# Patient Record
Sex: MALE | Race: WHITE | Employment: OTHER | URBAN - NONMETROPOLITAN AREA
[De-identification: names, ages, dates, MRNs, and addresses within clinical notes are randomized per-mention and may not be internally consistent; named-entity substitution may affect disease eponyms.]

---

## 2017-06-15 ENCOUNTER — OFFICE VISIT (OUTPATIENT)
Dept: PULMONOLOGY | Age: 82
End: 2017-06-15

## 2017-06-15 VITALS
HEIGHT: 64 IN | HEART RATE: 65 BPM | DIASTOLIC BLOOD PRESSURE: 62 MMHG | RESPIRATION RATE: 16 BRPM | WEIGHT: 138 LBS | BODY MASS INDEX: 23.56 KG/M2 | OXYGEN SATURATION: 96 % | SYSTOLIC BLOOD PRESSURE: 126 MMHG

## 2017-06-15 DIAGNOSIS — Z99.89 OSA ON CPAP: ICD-10-CM

## 2017-06-15 DIAGNOSIS — G47.33 OSA ON CPAP: ICD-10-CM

## 2017-06-15 PROCEDURE — 1036F TOBACCO NON-USER: CPT | Performed by: INTERNAL MEDICINE

## 2017-06-15 PROCEDURE — G8427 DOCREV CUR MEDS BY ELIG CLIN: HCPCS | Performed by: INTERNAL MEDICINE

## 2017-06-15 PROCEDURE — 4040F PNEUMOC VAC/ADMIN/RCVD: CPT | Performed by: INTERNAL MEDICINE

## 2017-06-15 PROCEDURE — G8420 CALC BMI NORM PARAMETERS: HCPCS | Performed by: INTERNAL MEDICINE

## 2017-06-15 PROCEDURE — 1123F ACP DISCUSS/DSCN MKR DOCD: CPT | Performed by: INTERNAL MEDICINE

## 2017-06-15 PROCEDURE — 99213 OFFICE O/P EST LOW 20 MIN: CPT | Performed by: INTERNAL MEDICINE

## 2018-06-21 ENCOUNTER — OFFICE VISIT (OUTPATIENT)
Dept: PULMONOLOGY | Age: 83
End: 2018-06-21
Payer: MEDICARE

## 2018-06-21 VITALS
TEMPERATURE: 97.8 F | DIASTOLIC BLOOD PRESSURE: 62 MMHG | OXYGEN SATURATION: 97 % | WEIGHT: 146 LBS | HEART RATE: 62 BPM | HEIGHT: 64 IN | SYSTOLIC BLOOD PRESSURE: 120 MMHG | BODY MASS INDEX: 24.92 KG/M2

## 2018-06-21 DIAGNOSIS — G47.33 OSA ON CPAP: Primary | ICD-10-CM

## 2018-06-21 DIAGNOSIS — Z99.89 OSA ON CPAP: Primary | ICD-10-CM

## 2018-06-21 PROCEDURE — G8427 DOCREV CUR MEDS BY ELIG CLIN: HCPCS | Performed by: INTERNAL MEDICINE

## 2018-06-21 PROCEDURE — 1123F ACP DISCUSS/DSCN MKR DOCD: CPT | Performed by: INTERNAL MEDICINE

## 2018-06-21 PROCEDURE — 4040F PNEUMOC VAC/ADMIN/RCVD: CPT | Performed by: INTERNAL MEDICINE

## 2018-06-21 PROCEDURE — G8419 CALC BMI OUT NRM PARAM NOF/U: HCPCS | Performed by: INTERNAL MEDICINE

## 2018-06-21 PROCEDURE — 1036F TOBACCO NON-USER: CPT | Performed by: INTERNAL MEDICINE

## 2018-06-21 PROCEDURE — 99213 OFFICE O/P EST LOW 20 MIN: CPT | Performed by: INTERNAL MEDICINE

## 2018-06-21 RX ORDER — TRAZODONE HYDROCHLORIDE 50 MG/1
25 TABLET ORAL PRN
COMMUNITY

## 2018-11-29 NOTE — PROGRESS NOTES
stridor. No respiratory distress. No wheezes. No rales. Patient exhibits no tenderness. Abdominal: Soft. Patient exhibits no distension. No tenderness. Musculoskeletal: Normal range of motion. Extremities: Patient exhibits no edema and no tenderness. Lymphadenopathy:  No cervical adenopathy. Neurological: Patient is alert and oriented to person, place, and time. Skin: Skin is warm and dry. Patient is not diaphoretic. Psychiatric: Patient  has a normal mood and affect. Patient behavior is normal.      Diagnostic Data:  Echocardiogram: 1/4/16          Diagnostic Data:   PAP Download:   Recorded compliance dates:5/13/18-6/11/18  More than 4hour usage compliance was 93:%. Average residual Apnea- Hypoapnea index on current pressue was:6.2.       PAP Type cpap   Level  8      Average usuage hours per day was:.[id number]               Interface: full     Provider:  []TYSHAWN                 []Haydee                []Suzette  []Dash                               [][facility]          [x]Other: Medicine & More    Assessment:  -Mild obstructive sleep apnea on treatment with a CPAP pressure of 10cm H20. He is using his CPAP with good compliance for >4hours. How ever he is still left with a residual AHI of 5.4 on current pressure of 10cm H20. It improved from 6.2 on a pressure of 8cm H20.  -Allergic rhinitis- He is currently using Flonase.  -Atrial flutter, paroxysmal (HCC) S/p ablation by Dr. Trupti Nelson at [facility], [facility], [facility].  -Hypertension- under control.  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   -Hx of Prostate cancer S/p surgery and radiation therapy. Recommendations/Plan:  -He was advised to continue melatonin to 3mg PO Qhs PRN. -Continue  Flonase 50mcg/INH 2sprays each nostril daily.  He is buying over the counter.  -Will increase his CPAP pressure to a CPAP of 12 H20 due to his residual AHI of 5.4 on current CPAP pressure of 10cm H20.  -He was advised to

## 2018-12-06 ENCOUNTER — OFFICE VISIT (OUTPATIENT)
Dept: PULMONOLOGY | Age: 83
End: 2018-12-06
Payer: MEDICARE

## 2018-12-06 VITALS
HEART RATE: 58 BPM | WEIGHT: 149.4 LBS | SYSTOLIC BLOOD PRESSURE: 118 MMHG | DIASTOLIC BLOOD PRESSURE: 76 MMHG | OXYGEN SATURATION: 98 % | HEIGHT: 65 IN | BODY MASS INDEX: 24.89 KG/M2

## 2018-12-06 DIAGNOSIS — G47.33 OSA ON CPAP: Primary | ICD-10-CM

## 2018-12-06 DIAGNOSIS — Z99.89 OSA ON CPAP: Primary | ICD-10-CM

## 2018-12-06 PROCEDURE — G8428 CUR MEDS NOT DOCUMENT: HCPCS | Performed by: INTERNAL MEDICINE

## 2018-12-06 PROCEDURE — G8484 FLU IMMUNIZE NO ADMIN: HCPCS | Performed by: INTERNAL MEDICINE

## 2018-12-06 PROCEDURE — 4040F PNEUMOC VAC/ADMIN/RCVD: CPT | Performed by: INTERNAL MEDICINE

## 2018-12-06 PROCEDURE — 99213 OFFICE O/P EST LOW 20 MIN: CPT | Performed by: INTERNAL MEDICINE

## 2018-12-06 PROCEDURE — G8419 CALC BMI OUT NRM PARAM NOF/U: HCPCS | Performed by: INTERNAL MEDICINE

## 2018-12-06 PROCEDURE — 1036F TOBACCO NON-USER: CPT | Performed by: INTERNAL MEDICINE

## 2018-12-06 PROCEDURE — 1123F ACP DISCUSS/DSCN MKR DOCD: CPT | Performed by: INTERNAL MEDICINE

## 2018-12-06 PROCEDURE — 1101F PT FALLS ASSESS-DOCD LE1/YR: CPT | Performed by: INTERNAL MEDICINE

## 2019-05-26 NOTE — PROGRESS NOTES
[place] [facility] and [address]                                 Sleep medicine clinic follow note. Chief Complaint: Katherine Beach is a 6 month f/u with a download      Chief complaint and [place]: Lisseth Sharp is a 80 y. o.oldmale came for follow up regarding his sleep apnea. He is currently using his positive airway pressure device with a CPAP pressure of 12cm H20. He denies any problems with machine or mask. He is sleeping well at night with out difficulty. He denies any daytime sleepiness. He is currently using melatonin to 5mg PO Qhs PRN. He is sleeping well. He is currently retired. He used to work in a factory during daytime                                Review of Systems:   General/Constitutional: he remain at same weight from the last visit with normal appetite. No fever or chills. HENT: Negative. Eyes: Negative. Upper respiratory tract: No nasal stuffiness or post nasal drip. Lower respiratory tract/ lungs: No cough or sputum production. No hemoptysis. Cardiovascular: No palpitations or chest pain. Gastrointestinal: No nausea or vomiting. Neurological: No focal neurologiacal weakness. Extremities: No edema. Musculoskeletal: No complaints. Genitourinary: No complaints. Hematological: Negative. Psychiatric/Behavioral: Negative. Skin: No itching.         Past Medical History:   Diagnosis Date    Atrial flutter, paroxysmal ([address]     Chronic kidney disease     Hyperlipidemia     Hypertension     Insomnia     KM on CPAP        Past Surgical History:   Procedure Laterality Date    FINGER TRIGGER RELEASE      HERNIA REPAIR      HYDROCELE EXCISION      PROSTATECTOMY         No Known Allergies    Current Outpatient Medications   Medication Sig Dispense Refill    b complex vitamins capsule Take 1 capsule by mouth daily 1000 mcg every other day      CPAP Machine MISC by Does not apply route Please increase his CPAP pressure to a CPAP of 12 H20 due to his residual AHI of 5.4 on current CPAP pressure of 10cm H20. 1 each 0    traZODone (DESYREL) 50 MG tablet Take 25 mg by mouth as needed for Sleep      RANITIDINE HCL PO Take by mouth daily      warfarin (COUMADIN) 5 MG tablet Take 5 mg by mouth 2.5 mg 5 days and 5mg 2 days      metoprolol (LOPRESSOR) 25 MG tablet Take 25 mg by mouth 2 times daily Take 1.5 tabs twice daily      Ascorbic Acid (VITAMIN C) 500 MG CAPS Take by mouth      calcium carbonate 600 MG TABS tablet Take 1 tablet by mouth daily      Multiple Vitamins-Minerals (THERAPEUTIC MULTIVITAMIN-MINERALS) tablet Take 1 tablet by mouth daily      losartan (COZAAR) 100 MG tablet Take 50 mg by mouth daily       melatonin 5 MG TABS tablet Take 3 mg by mouth daily       amLODIPine (NORVASC) 5 MG tablet Take 5 mg by mouth daily Take 1.5 tabs daily      fluticasone (FLONASE) 50 MCG/ACT nasal spray 2 sprays by Nasal route daily (Patient taking differently: 2 sprays by Nasal route as needed ) 1 Bottle 6    Omega 3 1000 MG CAPS Take by mouth       No current facility-administered medications for this visit. Family History   Problem Relation Age of Onset    High Blood Pressure Sister     Stroke Sister           /78   Pulse 61   Ht 5' 5\" (1.651 m)   Wt 149 lb 12.8 oz (67.9 kg)   SpO2 97% Comment: room air at rest  BMI 24.93 kg/m²   Mallampati airway Class:4  Neck Circumference:.16 Inches  [place] sleepiness score 6/6/19: 8  SAQLI;14.       Physical Exam   Nursing note and vitals reviewed. Constitutional: Patient appears moderately built and moderately nourished. No distress. Patient is oriented to person, place, and time. HENT:   Head: Normocephalic and atraumatic. Right Ear: External ear normal.   Left Ear: External ear normal.   Mouth/Throat: Oropharynx is clear and moist.  No oral thrush. Eyes: Conjunctivae are normal. Pupils are equal, round, and reactive to light. No scleral icterus. Neck: Neck supple. No JVD present.  No tracheal deviation present. Cardiovascular: Normal rate, regular rhythm, normal heart sounds. No murmur heard. Pulmonary/Chest: Effort normal and breath sounds normal. No stridor. No respiratory distress. No wheezes. No rales. Patient exhibits no tenderness. Abdominal: Soft. Patient exhibits no distension. No tenderness. Musculoskeletal: Normal range of motion. Extremities: Patient exhibits no edema and no tenderness. Lymphadenopathy:  No cervical adenopathy. Neurological: Patient is alert and oriented to person, place, and time. Skin: Skin is warm and dry. Patient is not diaphoretic. Psychiatric: Patient  has a normal mood and affect. Patient behavior is normal.       Diagnostic Data:  Echocardiogram: 1/4/16          Diagnostic Data:   PAP Download:   Recorded compliance dates:4/22/19-5/21/19  More than 4hour usage compliance was:100%. Average residual Apnea- Hypoapnea index on current pressue was:4.0.       PAP Type CPAP   Level  12      Average usuage hours per day was:. [id number]         Interface: full     Provider:  []TYSHAWN                 []Haydee                []Suzette  []Dash                               [][facility]          [x]Other: MEDICINE & MORE    Assessment:  -Mild obstructive sleep apnea on treatment with a CPAP pressure of 12cm H20. He is using his CPAP with good compliance for >4hours. His residual AHI is under control with current pressure. Kike Platt currently using his CPAP/BiPAP/Auto SV machine and benefiting from it. Will continue his current therapy.  -Allergic rhinitis- He is currently using Flonase.  -Atrial flutter, paroxysmal (HCC) S/p ablation by Dr. Ines Lugo at [facility].  -Hypertension- under control.  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   -Hx of Prostate cancer S/p surgery and radiation therapy. Recommendations/Plan:  -He was advised to continue melatonin to 5mg PO Qhs PRN.   -Continue Flonase 50mcg/INH 2sprays each nostril daily. He is buying from over the counter.  -He was advised to continue positive airway pressure therapy with above described pressure.  -He advised to keep good compliance with current recommended pressure to get optimal results and clinical improvement.  -Follow with my clinic in 12months for clinical reevaluation with review of download. -He was advised to call [employer] regarding supplies if needed.  -He call my office for earlier appointment if needed for worsening of sleep symptoms.  -Miranda Tapia and his wife were educated about my impression and plan. They verbalizes understanding.

## 2019-06-06 ENCOUNTER — OFFICE VISIT (OUTPATIENT)
Dept: PULMONOLOGY | Age: 84
End: 2019-06-06
Payer: MEDICARE

## 2019-06-06 VITALS
WEIGHT: 149.8 LBS | BODY MASS INDEX: 24.96 KG/M2 | DIASTOLIC BLOOD PRESSURE: 78 MMHG | SYSTOLIC BLOOD PRESSURE: 122 MMHG | OXYGEN SATURATION: 97 % | HEIGHT: 65 IN | HEART RATE: 61 BPM

## 2019-06-06 DIAGNOSIS — G47.33 OSA ON CPAP: Primary | ICD-10-CM

## 2019-06-06 DIAGNOSIS — Z99.89 OSA ON CPAP: Primary | ICD-10-CM

## 2019-06-06 PROCEDURE — G8420 CALC BMI NORM PARAMETERS: HCPCS | Performed by: INTERNAL MEDICINE

## 2019-06-06 PROCEDURE — 99213 OFFICE O/P EST LOW 20 MIN: CPT | Performed by: INTERNAL MEDICINE

## 2019-06-06 PROCEDURE — 1123F ACP DISCUSS/DSCN MKR DOCD: CPT | Performed by: INTERNAL MEDICINE

## 2019-06-06 PROCEDURE — G8427 DOCREV CUR MEDS BY ELIG CLIN: HCPCS | Performed by: INTERNAL MEDICINE

## 2019-06-06 PROCEDURE — 1036F TOBACCO NON-USER: CPT | Performed by: INTERNAL MEDICINE

## 2019-06-06 PROCEDURE — 4040F PNEUMOC VAC/ADMIN/RCVD: CPT | Performed by: INTERNAL MEDICINE

## 2019-06-06 RX ORDER — VITAMIN B COMPLEX
1 CAPSULE ORAL DAILY
COMMUNITY
End: 2022-09-08

## 2019-06-06 NOTE — PROGRESS NOTES
Chief Complaint: Rand Zapata is a 6 month f/u with a download    Mallampati airway Class:4  Neck Circumference:.16 Inches    [place] sleepiness score 6/6/19: 8  SAQLI;14.      Diagnostic Data:   PAP Download:   Recorded compliance dates:4/22/19-5/21/19  More than 4hour usage compliance was:100%. Average residual Apnea- Hypoapnea index on current pressue was:4.0.      PAP Type CPAP   Level  12     Average usuage hours per day was:. [id number]     Interface: full    Provider:  []-MARYANA  []Haydee  []Suzette  []Dash         [][facility] [x]Other: MEDICINE & MORE

## 2020-05-21 NOTE — PROGRESS NOTES
[place] [facility] and [address]              Sleep medicine clinic follow note. Chief Complaint: Abbey Albright is a 1 year f/u with a download      Chief complaint and [place]: Trent Roas is a 80 y. o.oldmale came for follow up regarding his sleep apnea. He is currently using his positive airway pressure device with a CPAP pressure of 12cm H20. He denies any problems with machine or mask. He is sleeping well at night with out difficulty. He denies any daytime sleepiness. He is currently using melatonin to 5mg PO Qhs PRN. He is sleeping well. He is currently retired. He used to work in a factory during daytime         Review of systems:                           General/Constitutional: he lost 5lbs of weight from the last visit with normal appetite. No fever or chills. HENT: Negative. Eyes: Negative. Upper respiratory tract: No nasal stuffiness or post nasal drip. Lower respiratory tract/ lungs: No cough or sputum production. No hemoptysis. Cardiovascular: No palpitations or chest pain. Gastrointestinal: No nausea or vomiting. Neurological: No focal neurologiacal weakness. Extremities: No edema. Musculoskeletal: No complaints. Genitourinary: No complaints. Hematological: Negative. Psychiatric/Behavioral: Negative. Skin: No itching.       Past Medical History:   Diagnosis Date    Atrial flutter, paroxysmal ([address]     Chronic kidney disease     Hyperlipidemia     Hypertension     Insomnia     KM on CPAP        Past Surgical History:   Procedure Laterality Date    FINGER TRIGGER RELEASE      HERNIA REPAIR      HYDROCELE EXCISION      PROSTATECTOMY         No Known Allergies    Current Outpatient Medications   Medication Sig Dispense Refill    b complex vitamins capsule Take 1 capsule by mouth daily 1000 mcg every other day      CPAP Machine MISC by Does not apply route Please increase his CPAP pressure to a CPAP of 12 H20 due to his residual AHI of 5.4 on current CPAP pressure of 10cm H20. 1 each 0    traZODone (DESYREL) 50 MG tablet Take 25 mg by mouth as needed for Sleep      warfarin (COUMADIN) 5 MG tablet Take 2.5 mg by mouth       metoprolol (LOPRESSOR) 25 MG tablet Take 25 mg by mouth 2 times daily Take 1.5 tabs twice daily      Ascorbic Acid (VITAMIN C) 500 MG CAPS Take by mouth      calcium carbonate 600 MG TABS tablet Take 1 tablet by mouth daily      Multiple Vitamins-Minerals (THERAPEUTIC MULTIVITAMIN-MINERALS) tablet Take 1 tablet by mouth daily      losartan (COZAAR) 100 MG tablet Take 50 mg by mouth daily       amLODIPine (NORVASC) 5 MG tablet Take 5 mg by mouth daily Take 1.5 tabs daily      RANITIDINE HCL PO Take by mouth daily      fluticasone (FLONASE) 50 MCG/ACT nasal spray 2 sprays by Nasal route daily (Patient taking differently: 2 sprays by Nasal route as needed ) 1 Bottle 6    Omega 3 1000 MG CAPS Take by mouth      melatonin 5 MG TABS tablet Take 3 mg by mouth daily        No current facility-administered medications for this visit. Family History   Problem Relation Age of Onset    High Blood Pressure Sister     Stroke Sister           /64 (Site: Left Upper Arm, Position: Sitting, Cuff Size: Medium Adult)   Pulse 63   Ht 5' 5\" (1.651 m)   Wt 144 lb 9.6 oz (65.6 kg)   SpO2 96% Comment: room air at rest  BMI 24.06 kg/m²   Mallampati airway Class:4  Neck Circumference:.16 Inches  [place] sleepiness score 6/18/20: 8  SAQLI;14.     Physical Exam   Nursing note and vitals reviewed. Constitutional: Patient appears moderately built and moderately nourished. No distress. Patient is oriented to person, place, and time. HENT:   Head: Normocephalic and atraumatic. Right Ear: External ear normal.   Left Ear: External ear normal.   Mouth/Throat: Oropharynx is clear and moist.  No oral thrush. Eyes: Conjunctivae are normal. Pupils are equal, round, and reactive to light. No scleral icterus. Neck: Neck supple.  No JVD present. No tracheal deviation present. Cardiovascular: Normal rate, regular rhythm, normal heart sounds. No murmur heard. Pulmonary/Chest: Effort normal and breath sounds normal. No stridor. No respiratory distress. No wheezes. No rales. Patient exhibits no tenderness. Abdominal: Soft. Patient exhibits no distension. No tenderness. Musculoskeletal: Normal range of motion. Extremities: Patient exhibits no edema and no tenderness. Lymphadenopathy:  No cervical adenopathy. Neurological: Patient is alert and oriented to person, place, and time. Skin: Skin is warm and dry. Patient is not diaphoretic. Psychiatric: Patient  has a normal mood and affect. Patient behavior is normal.       Diagnostic Data:  Echocardiogram: 1/4/16          Diagnostic Data:     PAP Download:   Recorded compliance dates:5/3/20-6/1/20  More than 4hour usage compliance was:100%. Average residual Apnea- Hypoapnea index on current pressue was:3.1.       PAP Type cpap   Level  12      Average usuage hours per day was:.[id number]               Interface: full  Provider:  []?[facility]             []?[employer]                        []?[employer]          []? [employer]                           []?[facility]      [x]? Other: Medicine & More      Assessment:  -Mild obstructive sleep apnea on treatment with a CPAP pressure of 12cm H20. He is using his CPAP with good compliance for >4hours. His residual AHI is under control with current pressure. Tavon Aurosi currently using his CPAP machine and benefiting from it. Will continue his current therapy.  -Allergic rhinitis- He is currently using Flonase.  -Atrial flutter, paroxysmal (HCC) S[facility]  -Hypertension- under control.  -Insomnia due to sleep apnea- improved. He quit using Melatonin 5mg po Qhs.   -Hx of Prostate cancer S/p surgery and radiation therapy.       Recommendations/Plan:.  -Continue  Flonase 50mcg/INH 2sprays each nostril daily. He was advised to use Flonase with good compliance. He is buying from over the counter.  -He was advised to continue positive airway pressure therapy with above described pressure.  -He advised to keep good compliance with current recommended pressure to get optimal results and clinical improvement.  -Follow with my clinic in 12months for clinical reevaluation with review of download. -He was advised to call [employer] regarding supplies if needed.  -He call my office for earlier appointment if needed for worsening of sleep symptoms.  -Trent Rosa and his wife were educated about my impression and plan. They verbalizes understanding.

## 2020-06-18 ENCOUNTER — OFFICE VISIT (OUTPATIENT)
Dept: PULMONOLOGY | Age: 85
End: 2020-06-18
Payer: MEDICARE

## 2020-06-18 VITALS
OXYGEN SATURATION: 96 % | BODY MASS INDEX: 24.09 KG/M2 | WEIGHT: 144.6 LBS | HEART RATE: 63 BPM | HEIGHT: 65 IN | DIASTOLIC BLOOD PRESSURE: 64 MMHG | SYSTOLIC BLOOD PRESSURE: 122 MMHG

## 2020-06-18 PROCEDURE — 1036F TOBACCO NON-USER: CPT | Performed by: INTERNAL MEDICINE

## 2020-06-18 PROCEDURE — 99213 OFFICE O/P EST LOW 20 MIN: CPT | Performed by: INTERNAL MEDICINE

## 2020-06-18 PROCEDURE — 1123F ACP DISCUSS/DSCN MKR DOCD: CPT | Performed by: INTERNAL MEDICINE

## 2020-06-18 PROCEDURE — 4040F PNEUMOC VAC/ADMIN/RCVD: CPT | Performed by: INTERNAL MEDICINE

## 2020-06-18 PROCEDURE — G8420 CALC BMI NORM PARAMETERS: HCPCS | Performed by: INTERNAL MEDICINE

## 2020-06-18 PROCEDURE — G8427 DOCREV CUR MEDS BY ELIG CLIN: HCPCS | Performed by: INTERNAL MEDICINE

## 2020-06-18 RX ORDER — FLUTICASONE PROPIONATE 50 MCG
2 SPRAY, SUSPENSION (ML) NASAL DAILY
Qty: 1 BOTTLE | Refills: 11 | Status: SHIPPED | OUTPATIENT
Start: 2020-06-18 | End: 2021-06-18

## 2020-06-18 NOTE — PROGRESS NOTES
Chief Complaint: Kong Montano is a 1 year f/u with a download    Mallampati airway Class:4  Neck Circumference:.16 Inches    [place] sleepiness score 6/18/20: 8      Diagnostic Data:   PAP Download:   Recorded compliance dates:5/3/20-6/1/20  More than 4hour usage compliance was:100%. Average residual Apnea- Hypoapnea index on current pressue was:3.1.       PAP Type cpap   Level  12     Average usuage hours per day was:.[id number]     Interface: full  Provider:  []TYSHAWN  []Haydee  []Suzette  []Dash         [][facility] [x]Other: [facility] More

## 2021-08-12 NOTE — PROGRESS NOTES
[place] [facility] and [address]              Sleep medicine clinic follow note. Chief Complaint: Mallika Steward is a 1 year f/u with a download      Chief complaint and [place]: Brian Paez is a 80 y. o.oldmale came for follow up regarding his sleep apnea. He is currently using his positive airway pressure device with a CPAP pressure of 12cm H20. He denies any problems with machine or mask. He is sleeping well at night with out difficulty. He denies any daytime sleepiness. He recently underwent a treatment for sinus infection in the first week of Sep 2021. He not able to use his CPAP device on 1 Sep 2021. He is currently using melatonin to 5mg PO Qhs PRN. He is sleeping well. He is currently retired. He used to work in a factory during daytime         Review of systems:                           General/Constitutional: he lost 1lb of weight from the last visit. No fever or chills. HENT: Negative. Eyes: Negative. Upper respiratory tract: No nasal stuffiness or post nasal drip. Lower respiratory tract/ lungs: No cough or sputum production. Cardiovascular: No palpitations or chest pain. Gastrointestinal: No nausea or vomiting. Neurological: No focal neurologiacal weakness. Extremities: No edema. Musculoskeletal: No complaints. Genitourinary: No complaints. Hematological: Negative. Psychiatric/Behavioral: Negative. Skin: No itching.         Past Medical History:   Diagnosis Date    Atrial flutter, paroxysmal (HCC)     Chronic kidney disease     Hyperlipidemia     Hypertension     Insomnia     KM on CPAP        Past Surgical History:   Procedure Laterality Date    FINGER TRIGGER RELEASE      HERNIA REPAIR      HYDROCELE EXCISION      PROSTATECTOMY         No Known Allergies    Current Outpatient Medications   Medication Sig Dispense Refill    b complex vitamins capsule Take 1 capsule by mouth daily 1000 mcg every other day      CPAP Machine MISC by Does not apply route Please increase his CPAP pressure to a CPAP of 12 H20 due to his residual AHI of 5.4 on current CPAP pressure of 10cm H20. 1 each 0    traZODone (DESYREL) 50 MG tablet Take 25 mg by mouth as needed for Sleep      RANITIDINE HCL PO Take by mouth daily      warfarin (COUMADIN) 5 MG tablet Take 2.5 mg by mouth       metoprolol (LOPRESSOR) 25 MG tablet Take 25 mg by mouth 2 times daily Take 1.5 tabs twice daily      Ascorbic Acid (VITAMIN C) 500 MG CAPS Take by mouth      calcium carbonate 600 MG TABS tablet Take 1 tablet by mouth daily      Multiple Vitamins-Minerals (THERAPEUTIC MULTIVITAMIN-MINERALS) tablet Take 1 tablet by mouth daily      losartan (COZAAR) 100 MG tablet Take 50 mg by mouth daily       amLODIPine (NORVASC) 5 MG tablet Take 5 mg by mouth daily Take 1.5 tabs daily      fluticasone (FLONASE) 50 MCG/ACT nasal spray 2 sprays by Nasal route daily 1 Bottle 11    Omega 3 1000 MG CAPS Take by mouth (Patient not taking: Reported on 9/9/2021)      melatonin 5 MG TABS tablet Take 3 mg by mouth daily        No current facility-administered medications for this visit. Family History   Problem Relation Age of Onset    High Blood Pressure Sister     Stroke Sister           /62 (Site: Left Upper Arm, Position: Sitting, Cuff Size: Medium Adult)   Pulse 58   Temp 97.9 °F (36.6 °C)   Ht 5' 6\" (1.676 m)   Wt 143 lb (64.9 kg)   SpO2 98% Comment: room air at rest  BMI 23.08 kg/m²   Mallampati airway Class:4  Neck Circumference:.16 Inches  [place] sleepiness score 9/9/21: 6  Sleep apnea quality of life questionnaire:81     Physical Exam   Nursing note and vitals reviewed. Constitutional: Patient appears moderately built and moderately nourished. No distress. Patient is oriented to person, place, and time. HENT:   Head: Normocephalic and atraumatic.    Right Ear: External ear normal.   Left Ear: External ear normal.   Mouth/Throat: Oropharynx is clear and moist.  No oral thrush. Eyes: Conjunctivae are normal. Pupils are equal, round, and reactive to light. No scleral icterus. Neck: Neck supple. No JVD present. No tracheal deviation present. Cardiovascular: Normal rate, regular rhythm, normal heart sounds. No murmur heard. Pulmonary/Chest: Effort normal and breath sounds normal. No stridor. No respiratory distress. No wheezes. No rales. Patient exhibits no tenderness. Abdominal: Soft. Patient exhibits no distension. No tenderness. Musculoskeletal: Normal range of motion. Extremities: Patient exhibits no edema and no tenderness. Lymphadenopathy:  No cervical adenopathy. Neurological: Patient is alert and oriented to person, place, and time. Skin: Skin is warm and dry. Patient is not diaphoretic. Psychiatric: Patient  has a normal mood and affect. Patient behavior is normal. .       Diagnostic Data:  Echocardiogram: 1/4/16          Diagnostic Data:     PAP Download:   Recorded compliance dates:8/11/21-9/9/21  More than 4hour usage compliance was: 73:%. Average residual Apnea- Hypoapnea index on current pressue was:2.7.       PAP Type cpap   Level  12      Average usuage hours per day was:.[id number]                 Interface: full     Provider:  []?[facility]             []?[employer]                        []?[employer]          []? [employer]                           []?[facility]      [x]? Other: [facility] More      Assessment:  -Mild obstructive sleep apnea on treatment with a CPAP pressure of 12cm H20. He is using his CPAP with good compliance for >4hours. His residual AHI is under control with current pressure. Marsha Platt currently using his CPAP machine and benefiting from it. Will continue his current therapy.  -Allergic rhinitis- He is currently using Flonase.  -Atrial flutter, paroxysmal (HCC) S/p ablation by Dr. Sarah Huggins MD at [facility].  -Hypertension- under control.  -Insomnia due to sleep apnea- improved.  He is currently using Melatonin 5mg po Qhs along with trazodone 25 mg p.o. nightly as needed from family physician  -Hx of Prostate cancer S/p surgery and radiation therapy. Recommendations/Plan:.  -Continue  Flonase 50mcg/INH 2sprays each nostril daily. He was advised to use Flonase with good compliance. He is buying from over the counter.  -He was advised to continue positive airway pressure therapy with above described pressure.  -He advised to keep good compliance with current recommended pressure to get optimal results and clinical improvement.  -Follow with my clinic in 12months for clinical reevaluation with review of download. -He was advised to call [employer] regarding supplies if needed.  -He call my office for earlier appointment if needed for worsening of sleep symptoms.  -Marielos Reinoso and his wife were educated about my impression and plan. They verbalizes understanding.    -I personally reviewed and updated the Past medical hx, Past surgical hx,Social hx, Family hx, Medications, Allergies in the discrete data section of the patient chart. I also reviewed pertaining labs and Pulmonary medicine,Sleep medicine related, Pathological, Microbiological and Radiological investigations.

## 2021-09-09 ENCOUNTER — OFFICE VISIT (OUTPATIENT)
Dept: PULMONOLOGY | Age: 86
End: 2021-09-09
Payer: MEDICARE

## 2021-09-09 VITALS
TEMPERATURE: 97.9 F | DIASTOLIC BLOOD PRESSURE: 62 MMHG | OXYGEN SATURATION: 98 % | SYSTOLIC BLOOD PRESSURE: 118 MMHG | WEIGHT: 143 LBS | HEART RATE: 58 BPM | BODY MASS INDEX: 22.98 KG/M2 | HEIGHT: 66 IN

## 2021-09-09 DIAGNOSIS — G47.33 OSA ON CPAP: Primary | ICD-10-CM

## 2021-09-09 DIAGNOSIS — Z99.89 OSA ON CPAP: Primary | ICD-10-CM

## 2021-09-09 PROCEDURE — G8427 DOCREV CUR MEDS BY ELIG CLIN: HCPCS | Performed by: INTERNAL MEDICINE

## 2021-09-09 PROCEDURE — 99213 OFFICE O/P EST LOW 20 MIN: CPT | Performed by: INTERNAL MEDICINE

## 2021-09-09 PROCEDURE — 1036F TOBACCO NON-USER: CPT | Performed by: INTERNAL MEDICINE

## 2021-09-09 PROCEDURE — 4040F PNEUMOC VAC/ADMIN/RCVD: CPT | Performed by: INTERNAL MEDICINE

## 2021-09-09 PROCEDURE — 1123F ACP DISCUSS/DSCN MKR DOCD: CPT | Performed by: INTERNAL MEDICINE

## 2021-09-09 PROCEDURE — G8420 CALC BMI NORM PARAMETERS: HCPCS | Performed by: INTERNAL MEDICINE

## 2021-09-09 NOTE — PROGRESS NOTES
Chief Complaint: Brea Cortes is a 1 year f/u with download     Mallampati airway Class:4  Neck Circumference:.16 Inches    [place] sleepiness score 9/9/21:   Sleep apnea quality of life questionnaire:  .      Diagnostic Data:   PAP Download:   Recorded compliance dates:8/11/21-9/9/21  More than 4hour usage compliance was73:%. Average residual Apnea- Hypoapnea index on current pressue was:2.7.       PAP Type cpap   Level  12     Average usuage hours per day was:.[id number]     Interface: full    Provider:  []-MARYANA  []Haydee  []Suzette  []Dash         [][facility] [x]Other: Medicine [facility] More

## 2021-09-09 NOTE — PATIENT INSTRUCTIONS
Recommendations/Plan:.  -Continue  Flonase 50mcg/INH 2sprays each nostril daily. He was advised to use Flonase with good compliance. He is buying from over the counter.  -He was advised to continue positive airway pressure therapy with above described pressure.  -He advised to keep good compliance with current recommended pressure to get optimal results and clinical improvement.  -Follow with my clinic in 12months for clinical reevaluation with review of download. -He was advised to call [employer] regarding supplies if needed.  -He call my office for earlier appointment if needed for worsening of sleep symptoms.  -Nikki Lee and his wife were educated about my impression and plan.  They verbalizes understanding

## 2022-08-21 NOTE — PROGRESS NOTES
[facility] [address]              Sleep medicine clinic follow note. Chief Complaint: Hipolito Vuong is a 1 year f/u with a download      Chief complaint and [place]: Benitez De Leon is a 80 y. o.oldmale came for follow up regarding his sleep apnea. He is currently using his positive airway pressure device with a CPAP pressure of 12cm H20. He denies any problems with machine or mask. He recently received a new mask. He is sleeping well at night with out difficulty. He denies any daytime sleepiness. He is currently using melatonin to 5mg PO Qhs PRN. He is sleeping well. He is currently retired. He used to work in a factory during daytime    Review of systems:                           General/Constitutional: he lost 6lbs of weight from the last visit (he lost 12 pounds of weight from his initial portable sleep study performed in January 2016). No fever or chills. HENT: Negative. Eyes: Negative. Upper respiratory tract: No nasal stuffiness or post nasal drip. Lower respiratory tract/ lungs: No cough or sputum production. Cardiovascular: No palpitations or chest pain. Gastrointestinal: No nausea or vomiting. Neurological: No focal neurologiacal weakness. Extremities: No edema. Musculoskeletal: No complaints. Genitourinary: No complaints. Hematological: Negative. Psychiatric/Behavioral: Negative. Skin: No itching.       Past Medical History:   Diagnosis Date    Atrial flutter, paroxysmal (HCC)     Chronic kidney disease     Hyperlipidemia     Hypertension     Insomnia     KM on CPAP        Past Surgical History:   Procedure Laterality Date    FINGER TRIGGER RELEASE      HERNIA REPAIR      HYDROCELE EXCISION      PROSTATECTOMY         No Known Allergies    Current Outpatient Medications   Medication Sig Dispense Refill    tamsulosin (FLOMAX) 0.4 MG capsule Take 0.4 mg by mouth daily      traZODone (DESYREL) 50 MG tablet Take 25 mg by mouth as needed for Sleep warfarin (COUMADIN) 5 MG tablet Take 2.5 mg by mouth 5mg Monday and Wednesday  2.5 5days a weeks      metoprolol (LOPRESSOR) 25 MG tablet Take 25 mg by mouth 2 times daily Take 1.5 tabs twice daily      Ascorbic Acid (VITAMIN C) 500 MG CAPS Take by mouth      calcium carbonate 600 MG TABS tablet Take 1 tablet by mouth daily      Multiple Vitamins-Minerals (THERAPEUTIC MULTIVITAMIN-MINERALS) tablet Take 1 tablet by mouth daily      losartan (COZAAR) 100 MG tablet Take 50 mg by mouth daily       melatonin 5 MG TABS tablet Take 3 mg by mouth daily       amLODIPine (NORVASC) 5 MG tablet Take 5 mg by mouth daily Take 1.5 tabs daily      fluticasone (FLONASE) 50 MCG/ACT nasal spray 2 sprays by Nasal route daily 1 Bottle 11    CPAP Machine MISC by Does not apply route Please increase his CPAP pressure to a CPAP of 12 H20 due to his residual AHI of 5.4 on current CPAP pressure of 10cm H20. 1 each 0    RANITIDINE HCL PO Take by mouth daily       No current facility-administered medications for this visit. Family History   Problem Relation Age of Onset    High Blood Pressure Sister     Stroke Sister           /62 (Site: Left Upper Arm, Position: Sitting, Cuff Size: Medium Adult)   Pulse 60   Temp 97.8 °F (36.6 °C) (Temporal)   Ht 5' 4\" (1.626 m)   Wt 137 lb 12.8 oz (62.5 kg)   SpO2 98% Comment: R/A  BMI 23.65 kg/m²   Mallampati airway Class: 4  Neck Circumference:.14 Inches  Cullen sleepiness score 9/8/22:6   Sleep Apnea Quality of Life Questionnaire: 80     Physical Exam   Nursing note and vitals reviewed. Constitutional: Patient appears moderately built and moderately nourished. No distress. Patient is oriented to person, place, and time. HENT:   Head: Normocephalic and atraumatic. Right Ear: External ear normal.   Left Ear: External ear normal.   Mouth/Throat: Oropharynx is clear and moist.  No oral thrush. Eyes: Conjunctivae are normal. Pupils are equal, round, and reactive to light.  No scleral icterus. Neck: Neck supple. No JVD present. No tracheal deviation present. Cardiovascular: Normal rate, regular rhythm, normal heart sounds. No murmur heard. Pulmonary/Chest: Effort normal and breath sounds normal. No stridor. No respiratory distress. No wheezes. No rales. Patient exhibits no tenderness. Abdominal: Soft. Patient exhibits no distension. No tenderness. Musculoskeletal: Normal range of motion. Extremities: Patient exhibits no edema and no tenderness. Lymphadenopathy:  No cervical adenopathy. Neurological: Patient is alert and oriented to person, place, and time. Skin: Skin is warm and dry. Patient is not diaphoretic. Psychiatric: Patient  has a normal mood and affect. Patient behavior is normal.       Diagnostic Data:  Echocardiogram: 1/4/16          Diagnostic Data:   PAP Download:   Recorded compliance dates:08/02/2022-08/31/2022  More than 4hour usage compliance was:90%. Average residual Apnea- Hypoapnea index on current pressue was:2.3. PAP Type CPAP   Level  12      Average usuage hours per day was:.7 Hours 12 Minutes      Interface: Nasal     Provider:  []TYSHAWN             []Haydee                        []Suzette          []Dash                           [][facility]      [x]Other: [facility]        Assessment:  -Mild obstructive sleep apnea on treatment with a CPAP pressure of 12cm H20. He is using his CPAP with good compliance for >4hours. His residual AHI is under control with current pressure. Bernardo Platt currently using his CPAP machine and benefiting from it. Will continue his current therapy. However he lost 12 pounds of weight from his old portable/home sleep study in 2016.  -Allergic rhinitis-under control.  He is currently using Flonase.  -Hx Atrial flutter, paroxysmal (HCC) S/p ablation by Dr. Racheal Huggins MD at [facility], [facility], [facility].  -Hypertension on treatment with medications- under control.  -History of insomnia due to sleep apnea- improved. He is currently using Melatonin 5mg po Qhs along with trazodone 25 mg p.o. nightly as needed from family physician  -Hx of Prostate cancer S/p surgery and radiation therapy. Recommendations/Plan:.  -Continue  Flonase 50mcg/INH 2sprays each nostril daily on PRN basis. He denies any hx of nose bleeds or any side effects.  -Due to loss of 12 pounds of weight from his initial portable/home sleep study, he was offered to go for repeat home/portable sleep study to check the current status of sleep apnea. At the end of the discussion he want to continue his PAP therapy. He did not want to go for repeat portable/home sleep study.  -He was advised to continue positive airway pressure therapy with above described pressure.  -He advised to keep good compliance with current recommended pressure to get optimal results and clinical improvement.  -He was advised to call [employer] regarding supplies if needed.  -He call my office for earlier appointment if needed for worsening of sleep symptoms.  -Follow with my clinic in 12months for clinical reevaluation with review of download. Ken Eldridge and his daughter were educated about my impression and plan. They verbalizes understanding.    -I personally reviewed and updated the Past medical hx, Past surgical hx,Social hx, Family hx, Medications, Allergies in the discrete data section of the patient chart. I also reviewed pertaining labs and Pulmonary medicine,Sleep medicine related, Pathological, Microbiological and Radiological investigations.

## 2022-09-08 ENCOUNTER — OFFICE VISIT (OUTPATIENT)
Dept: PULMONOLOGY | Age: 87
End: 2022-09-08
Payer: MEDICARE

## 2022-09-08 VITALS
DIASTOLIC BLOOD PRESSURE: 62 MMHG | OXYGEN SATURATION: 98 % | HEART RATE: 60 BPM | TEMPERATURE: 97.8 F | SYSTOLIC BLOOD PRESSURE: 122 MMHG | HEIGHT: 64 IN | WEIGHT: 137.8 LBS | BODY MASS INDEX: 23.52 KG/M2

## 2022-09-08 DIAGNOSIS — Z99.89 OSA ON CPAP: Primary | ICD-10-CM

## 2022-09-08 DIAGNOSIS — G47.33 OSA ON CPAP: Primary | ICD-10-CM

## 2022-09-08 PROCEDURE — 1036F TOBACCO NON-USER: CPT | Performed by: INTERNAL MEDICINE

## 2022-09-08 PROCEDURE — 1123F ACP DISCUSS/DSCN MKR DOCD: CPT | Performed by: INTERNAL MEDICINE

## 2022-09-08 PROCEDURE — 99213 OFFICE O/P EST LOW 20 MIN: CPT | Performed by: INTERNAL MEDICINE

## 2022-09-08 PROCEDURE — G8427 DOCREV CUR MEDS BY ELIG CLIN: HCPCS | Performed by: INTERNAL MEDICINE

## 2022-09-08 PROCEDURE — G8420 CALC BMI NORM PARAMETERS: HCPCS | Performed by: INTERNAL MEDICINE

## 2022-09-08 RX ORDER — TAMSULOSIN HYDROCHLORIDE 0.4 MG/1
0.4 CAPSULE ORAL DAILY
COMMUNITY

## 2022-09-08 NOTE — PROGRESS NOTES
Chief Complaint: 12 month f/u with download    Mallampati airway Class: 4  Neck Circumference:.14 Inches    W[place]rop sleepiness score 9/8/22:6 Sleep Apnea Quality of Life Questionnaire:.87      Diagnostic Data:   PAP Download:   Recorded compliance dates:08/02/2022-08/31/2022  More than 4hour usage compliance was:90%. Average residual Apnea- Hypoapnea index on current pressue was:2.3.       PAP Type CPAP   Level  12     Average usuage hours per day was:.7 Hours 12 Minutes     Interface: Nasal    Provider:  []-[email or web address]  []Haydee  []Suzette  []Dash         [][facility] [x]Other: [facility]

## 2022-09-08 NOTE — PATIENT INSTRUCTIONS
Recommendations/Plan:.  -Continue  Flonase 50mcg/INH 2sprays each nostril daily on PRN basis. He denies any hx of nose bleeds or any side effects.  -Due to loss of 12 pounds of weight from his initial portable/home sleep study, he was offered to go for repeat home/portable sleep study to check the current status of sleep apnea. At the end of the discussion he want to continue his PAP therapy. He did not want to go for repeat portable/home sleep study.  -He was advised to continue positive airway pressure therapy with above described pressure.  -He advised to keep good compliance with current recommended pressure to get optimal results and clinical improvement.  -He was advised to call [employer] regarding supplies if needed.  -He call my office for earlier appointment if needed for worsening of sleep symptoms.  -Follow with my clinic in 12months for clinical reevaluation with review of download. Tessie Lopez and his daughter were educated about my impression and plan. They verbalizes understanding.

## 2023-12-13 NOTE — PROGRESS NOTES
[facility]      Annabelle Hadley         [id number]  12/14/2023   Chief Complaint   Patient presents with    Follow-up     15mo KM f/u w/[facility] [place] download. Doing well. Needs script for PAP supplies. Notes a couple of weeks ago he was having sinus congestion. Is accompanied by his wife, [place], and his daughter, Tawnya Sanz. Pt of Dr. David Gilmore    PAP Download:   Original or initial AHI: 10.1     Date of initial study: 1/27/2016      Compliant  47%     Noncompliant 53 %     PAP Type CPAP     Level  12 cmH2O   Avg Hrs/Day 4hrs 9mins  AHI: 0.6   Leaks : 95 th percentile: 6.8   Recorded compliance dates , 11/13/23  to 12/12/23   Machine/Mfg:   [x] [employer]Med    [] Respironics/Dreamstation   Interface:   [] Nasal    [] Nasal pillows   [x] FFM      Provider:      [] [facility]     []Haydee     [] Suzette    [] Terese Godinez    [] Dann               [] [facility]      [] [employer]    [] [address] [facility] [address]:      [x] [facility] [place]    Neck Size: 14 inches  Mallampati 4  ESS:  14  SAQLI: 87    Here is a scan of the most recent download:              Presentation:   Marcelino Brown presents for 15 month sleep medicine follow up for obstructive sleep apnea  Since the last visit, Marcelino Brown is struggling with 4 hour compliance, is up and down to go to bathroom  Some nights up in middle of night and goes to chair to watch TV. Does not always put mask back on, some nights he does. H/o prostate cancer s/p radiation therapy, states this has messed with his bowels  Feels benefit with CPAP use. His machine showing code \" motor life exceeded\"   Recent ECHO at [facility]:  EF 60%. Right and left atrium dilation     Progress History:   Since last visit any new medical issues? No  Any trouble with Machine No  Any new sleep medicines? no  Trouble Falling Asleep No  Trouble Staying Asleep Yes   Frequency: every night   Snoring no    Equipment issues:   The pressure is  acceptable, the mask is acceptable

## 2023-12-14 ENCOUNTER — OFFICE VISIT (OUTPATIENT)
Dept: PULMONOLOGY | Age: 88
End: 2023-12-14
Payer: MEDICARE

## 2023-12-14 VITALS
WEIGHT: 135.4 LBS | SYSTOLIC BLOOD PRESSURE: 124 MMHG | BODY MASS INDEX: 23.12 KG/M2 | HEART RATE: 90 BPM | OXYGEN SATURATION: 96 % | TEMPERATURE: 97.3 F | DIASTOLIC BLOOD PRESSURE: 60 MMHG | HEIGHT: 64 IN

## 2023-12-14 DIAGNOSIS — G47.33 OSA ON CPAP: Primary | ICD-10-CM

## 2023-12-14 PROCEDURE — 1036F TOBACCO NON-USER: CPT | Performed by: NURSE PRACTITIONER

## 2023-12-14 PROCEDURE — 99214 OFFICE O/P EST MOD 30 MIN: CPT | Performed by: NURSE PRACTITIONER

## 2023-12-14 PROCEDURE — G8484 FLU IMMUNIZE NO ADMIN: HCPCS | Performed by: NURSE PRACTITIONER

## 2023-12-14 PROCEDURE — G8427 DOCREV CUR MEDS BY ELIG CLIN: HCPCS | Performed by: NURSE PRACTITIONER

## 2023-12-14 PROCEDURE — 1123F ACP DISCUSS/DSCN MKR DOCD: CPT | Performed by: NURSE PRACTITIONER

## 2023-12-14 PROCEDURE — G8420 CALC BMI NORM PARAMETERS: HCPCS | Performed by: NURSE PRACTITIONER

## 2024-03-06 ENCOUNTER — TELEPHONE (OUTPATIENT)
Dept: PULMONOLOGY | Age: 89
End: 2024-03-06

## 2024-03-06 NOTE — TELEPHONE ENCOUNTER
LOV:12/14/23    NOV:4-17-24    Venus is requesting an  order for a new CPAP machine to [place] [facility] [place]  Phone: [phone]  Fax: [phone]  Please follow up with Venus concerning this matter.

## 2024-03-07 ENCOUNTER — TELEPHONE (OUTPATIENT)
Dept: PULMONOLOGY | Age: 89
End: 2024-03-07

## 2024-03-07 NOTE — TELEPHONE ENCOUNTER
Spoke with Venus daughter of patient wanting a new pap order, I spoke with [facility] and they stated that patient needs a face to face and another pap order due to Dec appt is too old to use.....

## 2024-03-27 ENCOUNTER — OFFICE VISIT (OUTPATIENT)
Dept: PULMONOLOGY | Age: 89
End: 2024-03-27
Payer: MEDICARE

## 2024-03-27 VITALS
SYSTOLIC BLOOD PRESSURE: 100 MMHG | OXYGEN SATURATION: 100 % | HEART RATE: 69 BPM | DIASTOLIC BLOOD PRESSURE: 60 MMHG | BODY MASS INDEX: 22.86 KG/M2 | TEMPERATURE: 98.8 F | HEIGHT: 65 IN | WEIGHT: 137.2 LBS

## 2024-03-27 DIAGNOSIS — G47.33 OSA ON CPAP: Primary | ICD-10-CM

## 2024-03-27 PROCEDURE — 99214 OFFICE O/P EST MOD 30 MIN: CPT | Performed by: NURSE PRACTITIONER

## 2024-03-27 PROCEDURE — G8427 DOCREV CUR MEDS BY ELIG CLIN: HCPCS | Performed by: NURSE PRACTITIONER

## 2024-03-27 PROCEDURE — 1123F ACP DISCUSS/DSCN MKR DOCD: CPT | Performed by: NURSE PRACTITIONER

## 2024-03-27 PROCEDURE — G8484 FLU IMMUNIZE NO ADMIN: HCPCS | Performed by: NURSE PRACTITIONER

## 2024-03-27 PROCEDURE — G8420 CALC BMI NORM PARAMETERS: HCPCS | Performed by: NURSE PRACTITIONER

## 2024-03-27 PROCEDURE — 1036F TOBACCO NON-USER: CPT | Performed by: NURSE PRACTITIONER

## 2024-03-27 ASSESSMENT — ENCOUNTER SYMPTOMS: DIARRHEA: 1

## 2024-03-27 NOTE — PROGRESS NOTES
for new machine       Patient verbalizes understanding.  We will see Delfino Au back in: 3 months with download on new machine.   DME is [facility]; pt very happy with their service     Information added by my medical assistant/LPN was reviewed today    billing based on medical decision making     CAMILA Teague-CNP   3/27/2024